# Patient Record
Sex: FEMALE | Employment: FULL TIME | URBAN - METROPOLITAN AREA
[De-identification: names, ages, dates, MRNs, and addresses within clinical notes are randomized per-mention and may not be internally consistent; named-entity substitution may affect disease eponyms.]

---

## 2021-09-03 ENCOUNTER — E-VISIT (OUTPATIENT)
Dept: FAMILY MEDICINE CLINIC | Facility: TELEHEALTH | Age: 51
End: 2021-09-03

## 2021-09-03 DIAGNOSIS — R39.89 SUSPECTED UTI: Primary | ICD-10-CM

## 2021-09-03 PROCEDURE — 99422 OL DIG E/M SVC 11-20 MIN: CPT | Performed by: NURSE PRACTITIONER

## 2021-09-03 RX ORDER — PHENAZOPYRIDINE HYDROCHLORIDE 200 MG/1
200 TABLET, FILM COATED ORAL 3 TIMES DAILY PRN
Qty: 6 TABLET | Refills: 0 | Status: SHIPPED | OUTPATIENT
Start: 2021-09-03 | End: 2021-09-05

## 2021-09-03 RX ORDER — NITROFURANTOIN 25; 75 MG/1; MG/1
100 CAPSULE ORAL 2 TIMES DAILY
Qty: 14 CAPSULE | Refills: 0 | Status: SHIPPED | OUTPATIENT
Start: 2021-09-03 | End: 2021-09-10

## 2021-09-03 NOTE — PATIENT INSTRUCTIONS
Urinary Tract Infection, Adult    A urinary tract infection (UTI) is an infection of any part of the urinary tract. The urinary tract includes the kidneys, ureters, bladder, and urethra. These organs make, store, and get rid of urine in the body.  Your health care provider may use other names to describe the infection. An upper UTI affects the ureters and kidneys (pyelonephritis). A lower UTI affects the bladder (cystitis) and urethra (urethritis).  What are the causes?  Most urinary tract infections are caused by bacteria in your genital area, around the entrance to your urinary tract (urethra). These bacteria grow and cause inflammation of your urinary tract.  What increases the risk?  You are more likely to develop this condition if:  · You have a urinary catheter that stays in place (indwelling).  · You are not able to control when you urinate or have a bowel movement (you have incontinence).  · You are female and you:  ? Use a spermicide or diaphragm for birth control.  ? Have low estrogen levels.  ? Are pregnant.  · You have certain genes that increase your risk (genetics).  · You are sexually active.  · You take antibiotic medicines.  · You have a condition that causes your flow of urine to slow down, such as:  ? An enlarged prostate, if you are male.  ? Blockage in your urethra (stricture).  ? A kidney stone.  ? A nerve condition that affects your bladder control (neurogenic bladder).  ? Not getting enough to drink, or not urinating often.  · You have certain medical conditions, such as:  ? Diabetes.  ? A weak disease-fighting system (immunesystem).  ? Sickle cell disease.  ? Gout.  ? Spinal cord injury.  What are the signs or symptoms?  Symptoms of this condition include:  · Needing to urinate right away (urgently).  · Frequent urination or passing small amounts of urine frequently.  · Pain or burning with urination.  · Blood in the urine.  · Urine that smells bad or unusual.  · Trouble urinating.  · Cloudy  urine.  · Vaginal discharge, if you are female.  · Pain in the abdomen or the lower back.  You may also have:  · Vomiting or a decreased appetite.  · Confusion.  · Irritability or tiredness.  · A fever.  · Diarrhea.  The first symptom in older adults may be confusion. In some cases, they may not have any symptoms until the infection has worsened.  How is this diagnosed?  This condition is diagnosed based on your medical history and a physical exam. You may also have other tests, including:  · Urine tests.  · Blood tests.  · Tests for sexually transmitted infections (STIs).  If you have had more than one UTI, a cystoscopy or imaging studies may be done to determine the cause of the infections.  How is this treated?  Treatment for this condition includes:  · Antibiotic medicine.  · Over-the-counter medicines to treat discomfort.  · Drinking enough water to stay hydrated.  If you have frequent infections or have other conditions such as a kidney stone, you may need to see a health care provider who specializes in the urinary tract (urologist).  In rare cases, urinary tract infections can cause sepsis. Sepsis is a life-threatening condition that occurs when the body responds to an infection. Sepsis is treated in the hospital with IV antibiotics, fluids, and other medicines.  Follow these instructions at home:    Medicines  · Take over-the-counter and prescription medicines only as told by your health care provider.  · If you were prescribed an antibiotic medicine, take it as told by your health care provider. Do not stop using the antibiotic even if you start to feel better.  General instructions  · Make sure you:  ? Empty your bladder often and completely. Do not hold urine for long periods of time.  ? Empty your bladder after sex.  ? Wipe from front to back after a bowel movement if you are female. Use each tissue one time when you wipe.  · Drink enough fluid to keep your urine pale yellow.  · Keep all follow-up  visits as told by your health care provider. This is important.  Contact a health care provider if:  · Your symptoms do not get better after 1-2 days.  · Your symptoms go away and then return.  Get help right away if you have:  · Severe pain in your back or your lower abdomen.  · A fever.  · Nausea or vomiting.  Summary  · A urinary tract infection (UTI) is an infection of any part of the urinary tract, which includes the kidneys, ureters, bladder, and urethra.  · Most urinary tract infections are caused by bacteria in your genital area, around the entrance to your urinary tract (urethra).  · Treatment for this condition often includes antibiotic medicines.  · If you were prescribed an antibiotic medicine, take it as told by your health care provider. Do not stop using the antibiotic even if you start to feel better.  · Keep all follow-up visits as told by your health care provider. This is important.  This information is not intended to replace advice given to you by your health care provider. Make sure you discuss any questions you have with your health care provider.  Document Revised: 12/05/2019 Document Reviewed: 06/27/2019  [employer] Patient Education © 2021 [employer]

## 2021-09-03 NOTE — PROGRESS NOTES
I have reviewed the e-Visit questionnaire and patient's answers, and my assessment and plan are as follows:    HPI  Lo Valencia is a 51 y.o. female  presents with complaint of pain with passing urine, abd pain, and freuqnecy. Reports symptoms are consistent with UTI.     Review of Systems - Negative except those listed in the HPI.      Diagnoses and all orders for this visit:    1. Suspected UTI (Primary)  -     nitrofurantoin, macrocrystal-monohydrate, (Macrobid) 100 MG capsule; Take 1 capsule by mouth 2 (Two) Times a Day for 7 days.  Dispense: 14 capsule; Refill: 0  -     phenazopyridine (Pyridium) 200 MG tablet; Take 1 tablet by mouth 3 (Three) Times a Day As Needed for Bladder Spasms for up to 2 days.  Dispense: 6 tablet; Refill: 0          FOLLOW-UP  If symptoms worsen or fail to improve, follow-up with PCP/Urgent Care/Emergency Department.      Time Documentation  Counseled patient  Counseling topics: diagnosis, treatment options and follow up plan  Total encounter time: counseling time more than 50% of visit: 15 minutes        Katie Morrow, OSEAS  09/03/21  15:06 EDT

## 2021-09-20 ENCOUNTER — E-VISIT (OUTPATIENT)
Dept: FAMILY MEDICINE CLINIC | Facility: TELEHEALTH | Age: 51
End: 2021-09-20

## 2021-09-20 DIAGNOSIS — R39.89 SUSPECTED UTI: Primary | ICD-10-CM

## 2021-09-20 PROCEDURE — 99422 OL DIG E/M SVC 11-20 MIN: CPT | Performed by: NURSE PRACTITIONER

## 2021-09-20 RX ORDER — SULFAMETHOXAZOLE AND TRIMETHOPRIM 800; 160 MG/1; MG/1
1 TABLET ORAL 2 TIMES DAILY
Qty: 10 TABLET | Refills: 0 | Status: SHIPPED | OUTPATIENT
Start: 2021-09-20 | End: 2021-09-25

## 2021-09-20 NOTE — PATIENT INSTRUCTIONS
I have prescribed you a different antibiotic, if you do not have relieve within 48 hours or symptoms return shortly after treatment, you should be seen in person for a urinalysis and urine culture.     Wipe front to back, increase water to flush the kidneys and avoid bladder irritants such as caffeine and alcohol.    -Warning signs: severe abdominal/pelvic/back pain, fever >101, blood in urine - seek medical attention as soon as possible for a hands on/objective exam and possible labs.     If symptoms worsen or do not improve follow up with your PCP or visit your nearest Urgent Care Center or ER.          Urinary Tract Infection, Adult  A urinary tract infection (UTI) is an infection of any part of the urinary tract. The urinary tract includes:  · The kidneys.  · The ureters.  · The bladder.  · The urethra.  These organs make, store, and get rid of pee (urine) in the body.  What are the causes?  This is caused by germs (bacteria) in your genital area. These germs grow and cause swelling (inflammation) of your urinary tract.  What increases the risk?  You are more likely to develop this condition if:  · You have a small, thin tube (catheter) to drain pee.  · You cannot control when you pee or poop (incontinence).  · You are female, and:  ? You use these methods to prevent pregnancy:  § A medicine that kills sperm (spermicide).  § A device that blocks sperm (diaphragm).  ? You have low levels of a female hormone (estrogen).  ? You are pregnant.  · You have genes that add to your risk.  · You are sexually active.  · You take antibiotic medicines.  · You have trouble peeing because of:  ? A prostate that is bigger than normal, if you are male.  ? A blockage in the part of your body that drains pee from the bladder (urethra).  ? A kidney stone.  ? A nerve condition that affects your bladder (neurogenic bladder).  ? Not getting enough to drink.  ? Not peeing often enough.  · You have other conditions, such  as:  ? Diabetes.  ? A weak disease-fighting system (immune system).  ? Sickle cell disease.  ? Gout.  ? Injury of the spine.  What are the signs or symptoms?  Symptoms of this condition include:  · Needing to pee right away (urgently).  · Peeing often.  · Peeing small amounts often.  · Pain or burning when peeing.  · Blood in the pee.  · Pee that smells bad or not like normal.  · Trouble peeing.  · Pee that is cloudy.  · Fluid coming from the vagina, if you are female.  · Pain in the belly or lower back.  Other symptoms include:  · Throwing up (vomiting).  · No urge to eat.  · Feeling mixed up (confused).  · Being tired and grouchy (irritable).  · A fever.  · Watery poop (diarrhea).  How is this treated?  This condition may be treated with:  · Antibiotic medicine.  · Other medicines.  · Drinking enough water.  Follow these instructions at home:    Medicines  · Take over-the-counter and prescription medicines only as told by your doctor.  · If you were prescribed an antibiotic medicine, take it as told by your doctor. Do not stop taking it even if you start to feel better.  General instructions  · Make sure you:  ? Pee until your bladder is empty.  ? Do not hold pee for a long time.  ? Empty your bladder after sex.  ? Wipe from front to back after pooping if you are a female. Use each tissue one time when you wipe.  · Drink enough fluid to keep your pee pale yellow.  · Keep all follow-up visits as told by your doctor. This is important.  Contact a doctor if:  · You do not get better after 1-2 days.  · Your symptoms go away and then come back.  Get help right away if:  · You have very bad back pain.  · You have very bad pain in your lower belly.  · You have a fever.  · You are sick to your stomach (nauseous).  · You are throwing up.  Summary  · A urinary tract infection (UTI) is an infection of any part of the urinary tract.  · This condition is caused by germs in your genital area.  · There are many risk factors for  a UTI. These include having a small, thin tube to drain pee and not being able to control when you pee or poop.  · Treatment includes antibiotic medicines for germs.  · Drink enough fluid to keep your pee pale yellow.  This information is not intended to replace advice given to you by your health care provider. Make sure you discuss any questions you have with your health care provider.  Document Revised: 12/05/2019 Document Reviewed: 06/27/2019  [employer] Patient Education © 2021 [employer]

## 2021-09-20 NOTE — PROGRESS NOTES
Lo Valencia    1970  [id number]    I have reviewed the e-Visit questionnaire and patient's answers, my assessment and plan are as follows:    HPI- Lo Valencia is a 51 y.o. female with complaints sharp pain with urination, frequency x 1-2 weeks. She was seen for the same symptoms on 9/3/2021 and was treated for a UTI with Macrobid. She feels like symptoms improved a lot but did not revolve completely and have now started to return. She is wanting to try a different or stronger antibiotic. Denies fever, genital sores, hx of kidney problems, recent  surgery.     Review of Systems    Review of Systems - negative unless mentioned in HPI      Diagnoses and all orders for this visit:    1. Suspected UTI (Primary)  -     sulfamethoxazole-trimethoprim (Bactrim DS) 800-160 MG per tablet; Take 1 tablet by mouth 2 (Two) Times a Day for 5 days.  Dispense: 10 tablet; Refill: 0    I have prescribed you a different antibiotic, if you do not have relieve within 48 hours or symptoms return shortly after treatment, you should be seen in person for a urinalysis and urine culture.     Wipe front to back, increase water to flush the kidneys and avoid bladder irritants such as caffeine and alcohol.    -Warning signs: severe abdominal/pelvic/back pain, fever >101, blood in urine - seek medical attention as soon as possible for a hands on/objective exam and possible labs.     If symptoms worsen or do not improve follow up with your PCP or visit your nearest Urgent Care Center or ER.        Any medications prescribed have been sent electronically to   [facility]/pharmacy #[id number] - [place], [place] - [address] AT [place] [facility] - [phone]  - [phone]   [address]  [facility] [address]  Phone: [phone] Fax: [phone]    I spent 15 min reviewing this chart.     Mary Greene, OSEAS  09/20/21  16:05 EDT

## 2021-10-27 ENCOUNTER — OFFICE VISIT (OUTPATIENT)
Dept: FAMILY MEDICINE CLINIC | Facility: CLINIC | Age: 51
End: 2021-10-27

## 2021-10-27 VITALS
HEIGHT: 63 IN | OXYGEN SATURATION: 97 % | SYSTOLIC BLOOD PRESSURE: 133 MMHG | TEMPERATURE: 98.6 F | WEIGHT: 179.8 LBS | BODY MASS INDEX: 31.86 KG/M2 | DIASTOLIC BLOOD PRESSURE: 91 MMHG | HEART RATE: 96 BPM

## 2021-10-27 DIAGNOSIS — Z13.0 SCREENING FOR DEFICIENCY ANEMIA: ICD-10-CM

## 2021-10-27 DIAGNOSIS — R35.0 FREQUENCY OF URINATION: Primary | ICD-10-CM

## 2021-10-27 DIAGNOSIS — Z13.21 ENCOUNTER FOR VITAMIN DEFICIENCY SCREENING: ICD-10-CM

## 2021-10-27 DIAGNOSIS — Z13.29 SCREENING FOR THYROID DISORDER: ICD-10-CM

## 2021-10-27 LAB
BILIRUB BLD-MCNC: NEGATIVE MG/DL
CLARITY, POC: CLEAR
COLOR UR: YELLOW
EXPIRATION DATE: NORMAL
GLUCOSE UR STRIP-MCNC: NEGATIVE MG/DL
KETONES UR QL: NEGATIVE
LEUKOCYTE EST, POC: NEGATIVE
Lab: NORMAL
NITRITE UR-MCNC: NEGATIVE MG/ML
PH UR: 6 [PH] (ref 5–8)
PROT UR STRIP-MCNC: NEGATIVE MG/DL
RBC # UR STRIP: NEGATIVE /UL
SP GR UR: 1.01 (ref 1–1.03)
UROBILINOGEN UR QL: NORMAL

## 2021-10-27 PROCEDURE — 81003 URINALYSIS AUTO W/O SCOPE: CPT | Performed by: NURSE PRACTITIONER

## 2021-10-27 PROCEDURE — 99213 OFFICE O/P EST LOW 20 MIN: CPT | Performed by: NURSE PRACTITIONER

## 2021-10-27 NOTE — PROGRESS NOTES
"Chief Complaint  Urinary Frequency    Subjective          Lo Valencia presents to [facility]  This is my first time seeing this patient.  Urinary Frequency   This is a new problem. The current episode started 1 to 4 weeks ago. The problem occurs intermittently. The problem has been unchanged. Quality: pressure. There has been no fever. There is no history of pyelonephritis. Associated symptoms include frequency. Pertinent negatives include no flank pain, hematuria, nausea or vomiting.   Patient had an virtual visit on 9/20/2021 for a urinary tract infection, patient has had 2 rounds of antibiotics for treatment.  Patient states she no longer has pain but still has urinary frequency and lower pelvic pressure.      Patient has been concerned because over the last 3 months, she has been having abdominal bloating, has gained 20 pounds, and has been having fatigue.  Patient states that she normally does not go to the doctor, she says that she has not been seen by a provider probably since she had her last baby who is now 15 years old.  Patient also is concerned that every so often she will have eye redness and eye pain towards the outer corner of her eye that will only last for about 2 days that usually comes and goes.    Objective   Vital Signs:   /91 (BP Location: Left arm, Patient Position: Sitting, Cuff Size: Adult)   Pulse 96   Temp 98.6 °F (37 °C) (Temporal)   Ht 160 cm (63\")   Wt 81.6 kg (179 lb 12.8 oz)   SpO2 97%   BMI 31.85 kg/m²     Physical Exam  Vitals reviewed.   Constitutional:       General: She is awake. She is not in acute distress.     Appearance: Normal appearance.   HENT:      Head: Normocephalic.      Right Ear: Hearing normal.      Left Ear: Hearing normal.   Eyes:      General: Lids are normal. Vision grossly intact.   Cardiovascular:      Rate and Rhythm: Normal rate and regular rhythm.      Pulses: Normal pulses.      Heart sounds: Normal heart " sounds.   Pulmonary:      Effort: Pulmonary effort is normal.      Breath sounds: Normal breath sounds.   Abdominal:      General: Abdomen is flat.      Palpations: Abdomen is soft.      Tenderness: There is no abdominal tenderness.   Skin:     General: Skin is warm and dry.      Capillary Refill: Capillary refill takes less than 2 seconds.   Neurological:      General: No focal deficit present.      Mental Status: She is alert.   Psychiatric:         Attention and Perception: Attention normal.         Mood and Affect: Mood normal.         Speech: Speech normal.         Behavior: Behavior is cooperative.        Result Review :   The following data was reviewed by: OSEAS Oro on 10/27/2021:    Brief Urine Lab Results  (Last result in the past 365 days)      Color   Clarity   Blood   Leuk Est   Nitrite   Protein   CREAT   Urine HCG        10/27/21 1527 Yellow   Clear   Negative   Negative   Negative   Negative                 Assessment and Plan    Diagnoses and all orders for this visit:    1. Frequency of urination (Primary)  -     POCT urinalysis dipstick, automated    2. Screening for deficiency anemia  -     CBC & Differential    3. Screening for thyroid disorder  -     TSH Rfx On Abnormal To Free T4    4. Encounter for vitamin deficiency screening  -     Vitamin D 25 Hydroxy    POCT urinalysis was negative for UTI, discussed with patient importance of fully emptying her bladder, and use of heating pad to help with pain.  Lab work being completed to rule out thyroid disorder, vitamin D deficiency, and anemia.  If lab results are normal, can follow-up for further testing.  If lab results are abnormal, will call patient to discuss results and further treatment plan.    I spent 20 minutes caring for Lo on this date of service. This time includes time spent by me in the following activities:reviewing tests, obtaining and/or reviewing a separately obtained history, performing a medically appropriate  examination and/or evaluation , counseling and educating the patient/family/caregiver, ordering medications, tests, or procedures and documenting information in the medical record    Follow Up   Return if symptoms worsen or fail to improve.  Patient was given instructions and counseling regarding her condition or for health maintenance advice. Please see specific information pulled into the AVS if appropriate.

## 2021-10-28 ENCOUNTER — TELEPHONE (OUTPATIENT)
Dept: FAMILY MEDICINE CLINIC | Facility: CLINIC | Age: 51
End: 2021-10-28

## 2021-10-28 DIAGNOSIS — Z13.0 SCREENING FOR IRON DEFICIENCY ANEMIA: Primary | ICD-10-CM

## 2021-10-28 LAB
25(OH)D3+25(OH)D2 SERPL-MCNC: 19.1 NG/ML (ref 30–100)
BASOPHILS # BLD AUTO: 0.1 X10E3/UL (ref 0–0.2)
BASOPHILS NFR BLD AUTO: 1 %
EOSINOPHIL # BLD AUTO: 0.4 X10E3/UL (ref 0–0.4)
EOSINOPHIL NFR BLD AUTO: 4 %
ERYTHROCYTE [DISTWIDTH] IN BLOOD BY AUTOMATED COUNT: 15.6 % (ref 11.7–15.4)
HCT VFR BLD AUTO: 37.6 % (ref 34–46.6)
HGB BLD-MCNC: 11.6 G/DL (ref 11.1–15.9)
IMM GRANULOCYTES # BLD AUTO: 0 X10E3/UL (ref 0–0.1)
IMM GRANULOCYTES NFR BLD AUTO: 0 %
LYMPHOCYTES # BLD AUTO: 2.2 X10E3/UL (ref 0.7–3.1)
LYMPHOCYTES NFR BLD AUTO: 27 %
MCH RBC QN AUTO: 24 PG (ref 26.6–33)
MCHC RBC AUTO-ENTMCNC: 30.9 G/DL (ref 31.5–35.7)
MCV RBC AUTO: 78 FL (ref 79–97)
MONOCYTES # BLD AUTO: 0.6 X10E3/UL (ref 0.1–0.9)
MONOCYTES NFR BLD AUTO: 7 %
NEUTROPHILS # BLD AUTO: 5.2 X10E3/UL (ref 1.4–7)
NEUTROPHILS NFR BLD AUTO: 61 %
PLATELET # BLD AUTO: 358 X10E3/UL (ref 150–450)
RBC # BLD AUTO: 4.84 X10E6/UL (ref 3.77–5.28)
TSH SERPL DL<=0.005 MIU/L-ACNC: 1.31 UIU/ML (ref 0.45–4.5)
WBC # BLD AUTO: 8.4 X10E3/UL (ref 3.4–10.8)

## 2021-10-28 NOTE — TELEPHONE ENCOUNTER
Discussed lab results with patient. CBC indicative of iron deficiency anemia. Suggested coming in tomorrow for a lab only appointment for more specific lab work to confirm iron deficiency anemia. Patient agreed to come in for lab only appointment. Vitamin D lab result indicative of vitamin D deficiency, suggested patient start taking vitamin D2 or D3 (OTC) daily to help with deficiency. Patient agreed to start.

## 2021-10-30 LAB
FERRITIN SERPL-MCNC: 8 NG/ML (ref 15–150)
FOLATE SERPL-MCNC: 7.3 NG/ML
IRON SATN MFR SERPL: 9 % (ref 15–55)
IRON SERPL-MCNC: 41 UG/DL (ref 27–159)
RETICS/RBC NFR AUTO: 1.4 % (ref 0.6–2.6)
TIBC SERPL-MCNC: 451 UG/DL (ref 250–450)
UIBC SERPL-MCNC: 410 UG/DL (ref 131–425)
VIT B12 SERPL-MCNC: 573 PG/ML (ref 232–1245)

## 2021-11-01 ENCOUNTER — TELEPHONE (OUTPATIENT)
Dept: FAMILY MEDICINE CLINIC | Facility: CLINIC | Age: 51
End: 2021-11-01

## 2021-11-01 DIAGNOSIS — D50.9 IRON DEFICIENCY ANEMIA, UNSPECIFIED IRON DEFICIENCY ANEMIA TYPE: Primary | ICD-10-CM

## 2021-11-01 RX ORDER — FERROUS SULFATE 325(65) MG
325 TABLET ORAL
Qty: 30 TABLET | Refills: 0 | Status: SHIPPED | OUTPATIENT
Start: 2021-11-01 | End: 2021-11-26

## 2021-11-01 NOTE — TELEPHONE ENCOUNTER
Discussed lab results with patient which are indicative of iron deficiency anemia. Asked patient about menstrual history, patient states that she has always had heavy periods, has recently had some issues with periods that are shorter in duration. Discussed referral to gastroenterology to rule out any internal bleeding being the cause of iron deficiency anemia. Prescribed Ferrous sulfate tablets for patient to take daily.

## 2021-11-26 DIAGNOSIS — D50.9 IRON DEFICIENCY ANEMIA, UNSPECIFIED IRON DEFICIENCY ANEMIA TYPE: ICD-10-CM

## 2021-11-26 RX ORDER — FERROUS SULFATE 325(65) MG
325 TABLET ORAL
Qty: 30 TABLET | Refills: 0 | Status: SHIPPED | OUTPATIENT
Start: 2021-11-26 | End: 2021-12-26